# Patient Record
Sex: FEMALE | Race: WHITE
[De-identification: names, ages, dates, MRNs, and addresses within clinical notes are randomized per-mention and may not be internally consistent; named-entity substitution may affect disease eponyms.]

---

## 2019-09-15 ENCOUNTER — HOSPITAL ENCOUNTER (EMERGENCY)
Dept: HOSPITAL 72 - EMR | Age: 84
Discharge: HOME | End: 2019-09-15
Payer: COMMERCIAL

## 2019-09-15 VITALS — HEIGHT: 61 IN | BODY MASS INDEX: 30.78 KG/M2 | WEIGHT: 163 LBS

## 2019-09-15 VITALS — DIASTOLIC BLOOD PRESSURE: 84 MMHG | SYSTOLIC BLOOD PRESSURE: 140 MMHG

## 2019-09-15 VITALS — SYSTOLIC BLOOD PRESSURE: 148 MMHG | DIASTOLIC BLOOD PRESSURE: 86 MMHG
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
## 2019-09-15 DIAGNOSIS — S00.86XA: Primary | ICD-10-CM

## 2019-09-15 DIAGNOSIS — Z88.6: ICD-10-CM
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
## 2019-09-15 DIAGNOSIS — L03.211: ICD-10-CM

## 2019-09-15 PROCEDURE — 99282 EMERGENCY DEPT VISIT SF MDM: CPT

## 2019-09-15 NOTE — NUR
ED Nurse Note:

p walked in to ED  c/o insect bite to left facial and left elbow with swelling 
and itchiness. pt is alert x4. VSS

## 2024-03-18 NOTE — EMERGENCY ROOM REPORT
History of Present Illness


General


Chief Complaint:  Skin Rash/Abscess


Source:  Patient





Present Illness


HPI


86-year-old female presents with itchy bite to the left cheek, and left elbow, 

no fever no chills, no aggravating or alleviating factors, patient states that 

the lesions are very itchy severity is moderate, constant she has no other 

symptoms.  Patient presents for evaluation


Allergies:  


Coded Allergies:  


     CODEINE (Verified  Allergy, Unknown, 5/29/17)





Patient History


Past Medical History:  see triage record


Last Menstrual Period:  na


Reviewed Nursing Documentation:  PMH: Agreed; PSxH: Agreed





Nursing Documentation-PMH


Past Medical History:  No History, Except For





Review of Systems


All Other Systems:  negative except mentioned in HPI





Physical Exam





Vital Signs








  Date Time  Temp Pulse Resp B/P (MAP) Pulse Ox O2 Delivery O2 Flow Rate FiO2


 


9/15/19 16:38 97.9 62 20 146/85 (105) 98 Room Air  








Sp02 EP Interpretation:  reviewed, normal


General Appearance:  well appearing, no apparent distress, alert


Head:  normocephalic, atraumatic


Eyes:  bilateral eye PERRL, bilateral eye EOMI


ENT:  uvula midline, moist mucus membranes


Neck:  supple, thyroid normal, supple/symm/no masses


Respiratory:  no respiratory distress, no retraction, no accessory muscle use


Musculoskeletal:  normal inspection


Neurologic:  alert, oriented x3


Psychiatric:  mood/affect normal


Skin:  rash - Left urticarial rash on left cheek, and left elbow, warm/dry





Medical Decision Making


Diagnostic Impression:  


 Primary Impression:  


 Insect bite


 Qualified Codes:  W57.XXXA - Bitten or stung by nonvenomous insect and other 

nonvenomous arthropods, initial encounter


 Additional Impression:  


 Cellulitis


 Qualified Codes:  L03.211 - Cellulitis of face


ER Course


With insect bite to left face, left elbow, will provide patient with Benadryl 

and cortisone cream in the ED, will provide patient with antibiotic to prevent 

superinfection.





Last Vital Signs








  Date Time  Temp Pulse Resp B/P (MAP) Pulse Ox O2 Delivery O2 Flow Rate FiO2


 


9/15/19 16:38 97.9 62 20 146/85 (105) 98 Room Air  








Disposition:  HOME, SELF-CARE


Condition:  Stable


Scripts


Cephalexin* (CEPHALEXIN*) 500 Mg Tablet


500 MG ORAL EVERY 6 HOURS, #40 CAP


   Prov: Ronn Lewis MD         9/15/19


Referrals:  


[facility]





[facility]


Patient Instructions:  Cellulitis, Easy-to-Read, Insect Bite, Easy-to-Read, Rash





Additional Instructions:  


The patient was provided with discharge instructions, notified to follow-up 

with a primary care doctor and or specialist in the next 24-48 hours, and to 

return to the ED if they have worsening of their symptoms. 





Please note that this report is being documented using DRAGON technology.


This can lead to erroneous entry secondary to incorrect interpretation by the 

dictating instrument.











Ronn Lewis MD Sep 15, 2019 17:01
English